# Patient Record
Sex: FEMALE | Race: WHITE | NOT HISPANIC OR LATINO | Employment: FULL TIME | URBAN - METROPOLITAN AREA
[De-identification: names, ages, dates, MRNs, and addresses within clinical notes are randomized per-mention and may not be internally consistent; named-entity substitution may affect disease eponyms.]

---

## 2021-12-08 ENCOUNTER — OFFICE VISIT (OUTPATIENT)
Dept: OBSTETRICS AND GYNECOLOGY | Facility: CLINIC | Age: 36
End: 2021-12-08
Payer: COMMERCIAL

## 2021-12-08 VITALS
DIASTOLIC BLOOD PRESSURE: 64 MMHG | SYSTOLIC BLOOD PRESSURE: 106 MMHG | BODY MASS INDEX: 21.35 KG/M2 | WEIGHT: 140.88 LBS | HEIGHT: 68 IN

## 2021-12-08 DIAGNOSIS — Z86.39 HISTORY OF THYROID DISEASE: ICD-10-CM

## 2021-12-08 DIAGNOSIS — Z23 NEEDS FLU SHOT: ICD-10-CM

## 2021-12-08 DIAGNOSIS — Z30.09 BIRTH CONTROL COUNSELING: ICD-10-CM

## 2021-12-08 DIAGNOSIS — Z01.419 WELL WOMAN EXAM: Primary | ICD-10-CM

## 2021-12-08 LAB
B-HCG UR QL: NEGATIVE
CTP QC/QA: YES

## 2021-12-08 PROCEDURE — 99385 PREV VISIT NEW AGE 18-39: CPT | Mod: S$GLB,,, | Performed by: PHYSICIAN ASSISTANT

## 2021-12-08 PROCEDURE — 99999 PR PBB SHADOW E&M-NEW PATIENT-LVL III: ICD-10-PCS | Mod: PBBFAC,,, | Performed by: PHYSICIAN ASSISTANT

## 2021-12-08 PROCEDURE — 81025 URINE PREGNANCY TEST: CPT | Mod: S$GLB,,, | Performed by: PHYSICIAN ASSISTANT

## 2021-12-08 PROCEDURE — 99999 PR PBB SHADOW E&M-NEW PATIENT-LVL III: CPT | Mod: PBBFAC,,, | Performed by: PHYSICIAN ASSISTANT

## 2021-12-08 PROCEDURE — 81025 POCT URINE PREGNANCY: ICD-10-PCS | Mod: S$GLB,,, | Performed by: PHYSICIAN ASSISTANT

## 2021-12-08 PROCEDURE — 99385 PR PREVENTIVE VISIT,NEW,18-39: ICD-10-PCS | Mod: S$GLB,,, | Performed by: PHYSICIAN ASSISTANT

## 2021-12-09 ENCOUNTER — TELEPHONE (OUTPATIENT)
Dept: ORTHOPEDICS | Facility: CLINIC | Age: 36
End: 2021-12-09
Payer: COMMERCIAL

## 2021-12-09 RX ORDER — ETONOGESTREL AND ETHINYL ESTRADIOL VAGINAL RING .015; .12 MG/D; MG/D
1 RING VAGINAL
Qty: 1 EACH | Refills: 11 | Status: SHIPPED | OUTPATIENT
Start: 2021-12-09 | End: 2023-07-20

## 2021-12-22 ENCOUNTER — IMMUNIZATION (OUTPATIENT)
Dept: PRIMARY CARE CLINIC | Facility: CLINIC | Age: 36
End: 2021-12-22
Payer: COMMERCIAL

## 2021-12-22 DIAGNOSIS — Z23 NEED FOR VACCINATION: Primary | ICD-10-CM

## 2021-12-22 PROCEDURE — 0004A COVID-19, MRNA, LNP-S, PF, 30 MCG/0.3 ML DOSE VACCINE: CPT | Mod: CV19,PBBFAC | Performed by: INTERNAL MEDICINE

## 2023-07-06 ENCOUNTER — PATIENT OUTREACH (OUTPATIENT)
Dept: ADMINISTRATIVE | Facility: HOSPITAL | Age: 38
End: 2023-07-06
Payer: COMMERCIAL

## 2023-07-19 NOTE — PROGRESS NOTES
FAMILY MEDICINE  [facility]    Reason for visit:   Chief Complaint   Patient presents with    Establish Care    Annual Exam    Thyroid Problem         SUBJECTIVE: Ivania Ha is a 38 y.o. female  - hashimoto's hypothyroidism presents as a new patient to establish care and refill thyroid medication. Last PCP none recently    Gynecology Dr. Jelena Burden and pending appt to establish care and PAP 8/15/23    Ivania Ha reports that overall she is doing well but she has been out of her thyroid medication for 1 year. She was following with an Endocrinologist in [place] until she moved back to [facility] 2021. She has 1 year of refill of medications and was unable to be re-evaluated to continue her medication.She feels slightly tired but otherwise no symptoms    1. Hypothyroidism     Age diagnosed: 26 yo    Symptomatic at diagnosis: denies fatigue, weight changes, heat/cold intolerance, bowel/skin changes or CVS symptoms. Asymptomatic at time of diagnosis. Family history with father and grandfather  Prior evaluation by Endocrinologist: Yes since diagnosis   Prior thyroid US: yes - Hashimoto's at 30-33 yo and reports that no nodules    Current medication: none     Prior medication:  She reports most recently she was on 137 mcg daily. She reports that prior to her pregnancy she was stable for several years on 125 mcg daily. When she became pregnant with her second child her Endocrinologist increased her to 150 mcg daily. After delivery she was reduced to 137 mcg daily but was never able to follow-up to check her levels after the reduction    Side effects from medication: NA but no prior issues with generic  Scheduled for medication: NA    Symptoms from thyroid issue: fatigue  Concerns: denies    No results found for: TSH, J8JCILD, D0CMVGG, THYROIDAB, FREET4              Review of Systems   All other systems reviewed and are negative.    HEALTH MAINTENANCE:   Health Maintenance   Topic Date  Due    Hepatitis C Screening  Never done    Lipid Panel  Never done    Mammogram  Never done    TETANUS VACCINE  05/27/2030     Health Maintenance Topics with due status: Not Due       Topic Last Completion Date    TETANUS VACCINE 05/27/2020    Influenza Vaccine Not Due     Health Maintenance Due   Topic Date Due    Hepatitis C Screening  Never done    Cervical Cancer Screening  Never done    Lipid Panel  Never done    Mammogram  Never done    COVID-19 Vaccine (4 - Pfizer series) 02/16/2022       HISTORY:   Past Medical History:   Diagnosis Date    Hypothyroidism, unspecified        Past Surgical History:   Procedure Laterality Date    Left breast lumpectomy Left     at 8 yo       Family History   Problem Relation Age of Onset    Hyperlipidemia Mother     Hypertension Mother     Atrial fibrillation Mother     Parkinsonism Father     Hypothyroidism Father     Hypertension Brother     Hyperlipidemia Brother     Autism Daughter     No Known Problems Daughter     Hypertension Maternal Grandmother     Heart disease Maternal Grandmother     Hyperlipidemia Maternal Grandmother     Hypothyroidism Maternal Grandfather     Hypertension Maternal Grandfather     Heart disease Maternal Grandfather     Hyperlipidemia Maternal Grandfather     Diabetes Paternal Grandmother     Diabetes Paternal Grandfather     Breast cancer Neg Hx     Colon cancer Neg Hx     Ovarian cancer Neg Hx        Social History     Tobacco Use    Smoking status: Never    Smokeless tobacco: Never   Substance Use Topics    Alcohol use: Yes    Drug use: Never       Social History     Social History Narrative    . 2 daughters (2023 8 yo with autism and 1 yo). Grew up on the [facility] and mother still lives there. Father lives [place] with stepmother. 2023 father was diagnosed with parkinson's. She works for the [employer] and  works for [employer].She works from home and travels to [place] 2 times a month.        ALLERGIES:   Review of patient's allergies  "indicates:  No Known Allergies    MEDS:   Outpatient Medications as of 7/20/2023   Medication Sig Dispense Refill    levothyroxine (SYNTHROID) 125 MCG tablet Take 1 tablet (125 mcg total) by mouth before breakfast. 90 tablet 0     No current facility-administered medications on file as of 7/20/2023.       Vital signs:   Vitals:    07/20/23 1402   BP: 124/74   Pulse: 74   SpO2: 100%   Weight: 62.5 kg (137 lb 14.4 oz)   Height: 5' 8" (1.727 m)     Body mass index is 20.97 kg/m².    PHYSICAL EXAM:     Physical Exam  Vitals reviewed.   Constitutional:       General: She is not in acute distress.  HENT:      Head: Normocephalic and atraumatic.      Right Ear: Tympanic membrane and ear canal normal.      Left Ear: Tympanic membrane and ear canal normal.   Eyes:      General: No scleral icterus.     Conjunctiva/sclera: Conjunctivae normal.   Neck:      Thyroid: No thyromegaly.      Vascular: No carotid bruit.   Cardiovascular:      Rate and Rhythm: Normal rate and regular rhythm.      Pulses: Normal pulses.      Heart sounds: Normal heart sounds. No murmur heard.    No friction rub. No gallop.   Pulmonary:      Effort: Pulmonary effort is normal.      Breath sounds: Normal breath sounds. No wheezing or rales.   Abdominal:      General: Bowel sounds are normal. There is no distension.      Palpations: Abdomen is soft.      Tenderness: There is no abdominal tenderness.   Musculoskeletal:      Cervical back: Normal range of motion and neck supple.      Right lower leg: No edema.      Left lower leg: No edema.   Lymphadenopathy:      Cervical: No cervical adenopathy.   Skin:     General: Skin is warm.      Capillary Refill: Capillary refill takes less than 2 seconds.   Neurological:      Mental Status: She is alert.           PERTINENT RESULTS:   No visits with results within 1 Week(s) from this visit.   Latest known visit with results is:   Office Visit on 12/08/2021   Component Date Value Ref Range Status    POC Preg Test, " Ur 12/08/2021 Negative  Negative Final     Acceptable 12/08/2021 Yes   Final     No results found for: WBC, HGB, HCT, MCV, PLT    No results found for: TSH, W0CSNTQ, C9OKOEA, THYROIDAB, FREET4  CMP  No results found for: NA, K, CL, CO2, GLU, BUN, CREATININE, CALCIUM, PROT, ALBUMIN, BILITOT, ALKPHOS, AST, ALT, ANIONGAP, EGFRNORACEVR  No results found for: CHOL  No results found for: HDL  No results found for: LDLCALC  No results found for: DLDL  No results found for: TRIG    f1 No results found for: CHOLHDL      ASSESSMENT/PLAN:    1. Hypothyroidism due to Hashimoto's thyroiditis  Overview:  - recommend restart levothyroxine at 125 mcg daily and repeat TSH with FT4 in 6 weeks  - discussed will adjust based off of 6 week labs  - questions elicited and answered  - encouraged to patient to notify me of any questions or concerns    Orders:  -     TSH; Future; Expected date: 07/20/2023  -     T4, FREE; Future; Expected date: 07/20/2023  -     levothyroxine (SYNTHROID) 125 MCG tablet; Take 1 tablet (125 mcg total) by mouth before breakfast.  Dispense: 90 tablet; Refill: 0    2. Screening for metabolic disorder  -     Comprehensive Metabolic Panel; Future; Expected date: 07/20/2023    3. Screening, iron deficiency anemia  -     CBC Auto Differential; Future; Expected date: 07/20/2023    4. Encounter for lipid screening for cardiovascular disease  -     Lipid Panel; Future; Expected date: 07/20/2023    5. Screening for diabetes mellitus (DM)  -     Hemoglobin A1C; Future; Expected date: 07/20/2023    6. Need for hepatitis C screening test  -     Hepatitis C Antibody; Future; Expected date: 07/20/2023    7. Encounter for screening mammogram for breast cancer  -     Mammo Digital Screening Bilat w/ Dung; Future; Expected date: 07/20/2023          ORDERS:   Orders Placed This Encounter    Mammo Digital Screening Bilat w/ Dung    TSH    Lipid Panel    Hepatitis C Antibody    Hemoglobin A1C    Comprehensive  Metabolic Panel    CBC Auto Differential    T4, FREE    levothyroxine (SYNTHROID) 125 MCG tablet       Vaccines recommended: covid-19 booster    Follow-up pending results or sooner if any concerns.      This note is dictated using the M*Modal Fluency Direct word recognition program. There are word recognition mistakes that are occasionally missed on review.    Dr. aDng Bean D.O.   [facility]

## 2023-07-20 ENCOUNTER — OFFICE VISIT (OUTPATIENT)
Dept: PRIMARY CARE CLINIC | Facility: CLINIC | Age: 38
End: 2023-07-20
Attending: FAMILY MEDICINE
Payer: COMMERCIAL

## 2023-07-20 VITALS
OXYGEN SATURATION: 100 % | SYSTOLIC BLOOD PRESSURE: 124 MMHG | HEIGHT: 68 IN | DIASTOLIC BLOOD PRESSURE: 74 MMHG | BODY MASS INDEX: 20.9 KG/M2 | WEIGHT: 137.88 LBS | HEART RATE: 74 BPM

## 2023-07-20 DIAGNOSIS — Z13.1 SCREENING FOR DIABETES MELLITUS (DM): ICD-10-CM

## 2023-07-20 DIAGNOSIS — Z12.31 ENCOUNTER FOR SCREENING MAMMOGRAM FOR BREAST CANCER: ICD-10-CM

## 2023-07-20 DIAGNOSIS — Z11.59 NEED FOR HEPATITIS C SCREENING TEST: ICD-10-CM

## 2023-07-20 DIAGNOSIS — Z13.0 SCREENING, IRON DEFICIENCY ANEMIA: ICD-10-CM

## 2023-07-20 DIAGNOSIS — Z13.228 SCREENING FOR METABOLIC DISORDER: ICD-10-CM

## 2023-07-20 DIAGNOSIS — Z13.220 ENCOUNTER FOR LIPID SCREENING FOR CARDIOVASCULAR DISEASE: ICD-10-CM

## 2023-07-20 DIAGNOSIS — Z13.6 ENCOUNTER FOR LIPID SCREENING FOR CARDIOVASCULAR DISEASE: ICD-10-CM

## 2023-07-20 DIAGNOSIS — E06.3 HYPOTHYROIDISM DUE TO HASHIMOTO'S THYROIDITIS: Primary | ICD-10-CM

## 2023-07-20 DIAGNOSIS — E03.8 HYPOTHYROIDISM DUE TO HASHIMOTO'S THYROIDITIS: Primary | ICD-10-CM

## 2023-07-20 PROBLEM — Z00.01 ENCOUNTER FOR GENERAL ADULT MEDICAL EXAMINATION WITH ABNORMAL FINDINGS: Status: ACTIVE | Noted: 2023-07-20

## 2023-07-20 PROBLEM — Z00.01 ENCOUNTER FOR GENERAL ADULT MEDICAL EXAMINATION WITH ABNORMAL FINDINGS: Status: RESOLVED | Noted: 2023-07-20 | Resolved: 2023-07-20

## 2023-07-20 PROCEDURE — 3074F PR MOST RECENT SYSTOLIC BLOOD PRESSURE < 130 MM HG: ICD-10-PCS | Mod: CPTII,S$GLB,, | Performed by: FAMILY MEDICINE

## 2023-07-20 PROCEDURE — 3078F PR MOST RECENT DIASTOLIC BLOOD PRESSURE < 80 MM HG: ICD-10-PCS | Mod: CPTII,S$GLB,, | Performed by: FAMILY MEDICINE

## 2023-07-20 PROCEDURE — 99204 PR OFFICE/OUTPT VISIT, NEW, LEVL IV, 45-59 MIN: ICD-10-PCS | Mod: S$GLB,,, | Performed by: FAMILY MEDICINE

## 2023-07-20 PROCEDURE — 3008F PR BODY MASS INDEX (BMI) DOCUMENTED: ICD-10-PCS | Mod: CPTII,S$GLB,, | Performed by: FAMILY MEDICINE

## 2023-07-20 PROCEDURE — 99204 OFFICE O/P NEW MOD 45 MIN: CPT | Mod: S$GLB,,, | Performed by: FAMILY MEDICINE

## 2023-07-20 PROCEDURE — 3008F BODY MASS INDEX DOCD: CPT | Mod: CPTII,S$GLB,, | Performed by: FAMILY MEDICINE

## 2023-07-20 PROCEDURE — 99999 PR PBB SHADOW E&M-EST. PATIENT-LVL III: ICD-10-PCS | Mod: PBBFAC,,, | Performed by: FAMILY MEDICINE

## 2023-07-20 PROCEDURE — 99999 PR PBB SHADOW E&M-EST. PATIENT-LVL III: CPT | Mod: PBBFAC,,, | Performed by: FAMILY MEDICINE

## 2023-07-20 PROCEDURE — 1159F MED LIST DOCD IN RCRD: CPT | Mod: CPTII,S$GLB,, | Performed by: FAMILY MEDICINE

## 2023-07-20 PROCEDURE — 3078F DIAST BP <80 MM HG: CPT | Mod: CPTII,S$GLB,, | Performed by: FAMILY MEDICINE

## 2023-07-20 PROCEDURE — 3074F SYST BP LT 130 MM HG: CPT | Mod: CPTII,S$GLB,, | Performed by: FAMILY MEDICINE

## 2023-07-20 PROCEDURE — 1159F PR MEDICATION LIST DOCUMENTED IN MEDICAL RECORD: ICD-10-PCS | Mod: CPTII,S$GLB,, | Performed by: FAMILY MEDICINE

## 2023-07-20 RX ORDER — LEVOTHYROXINE SODIUM 125 UG/1
125 TABLET ORAL
Qty: 90 TABLET | Refills: 0 | Status: SHIPPED | OUTPATIENT
Start: 2023-07-20 | End: 2023-10-17

## 2023-08-15 ENCOUNTER — OFFICE VISIT (OUTPATIENT)
Dept: OBSTETRICS AND GYNECOLOGY | Facility: CLINIC | Age: 38
End: 2023-08-15
Payer: COMMERCIAL

## 2023-08-15 ENCOUNTER — HOSPITAL ENCOUNTER (OUTPATIENT)
Dept: RADIOLOGY | Facility: OTHER | Age: 38
Discharge: HOME | End: 2023-08-15
Attending: FAMILY MEDICINE
Payer: COMMERCIAL

## 2023-08-15 VITALS
WEIGHT: 139.56 LBS | HEIGHT: 68 IN | SYSTOLIC BLOOD PRESSURE: 122 MMHG | BODY MASS INDEX: 21.15 KG/M2 | DIASTOLIC BLOOD PRESSURE: 74 MMHG

## 2023-08-15 DIAGNOSIS — Z30.015 ENCOUNTER FOR INITIAL PRESCRIPTION OF VAGINAL RING HORMONAL CONTRACEPTIVE: ICD-10-CM

## 2023-08-15 DIAGNOSIS — Z12.4 CERVICAL CANCER SCREENING: ICD-10-CM

## 2023-08-15 DIAGNOSIS — Z11.51 ENCOUNTER FOR SCREENING FOR HUMAN PAPILLOMAVIRUS (HPV): ICD-10-CM

## 2023-08-15 DIAGNOSIS — Z01.419 WELL WOMAN EXAM WITH ROUTINE GYNECOLOGICAL EXAM: Primary | ICD-10-CM

## 2023-08-15 DIAGNOSIS — Z12.31 ENCOUNTER FOR SCREENING MAMMOGRAM FOR BREAST CANCER: ICD-10-CM

## 2023-08-15 DIAGNOSIS — Z30.09 BIRTH CONTROL COUNSELING: ICD-10-CM

## 2023-08-15 PROCEDURE — 3008F BODY MASS INDEX DOCD: CPT | Mod: CPTII,S$GLB,,

## 2023-08-15 PROCEDURE — 99999 PR PBB SHADOW E&M-EST. PATIENT-LVL III: CPT | Mod: PBBFAC,,,

## 2023-08-15 PROCEDURE — 87624 HPV HI-RISK TYP POOLED RSLT: CPT

## 2023-08-15 PROCEDURE — 3008F PR BODY MASS INDEX (BMI) DOCUMENTED: ICD-10-PCS | Mod: CPTII,S$GLB,,

## 2023-08-15 PROCEDURE — 3074F SYST BP LT 130 MM HG: CPT | Mod: CPTII,S$GLB,,

## 2023-08-15 PROCEDURE — 88175 CYTOPATH C/V AUTO FLUID REDO: CPT

## 2023-08-15 PROCEDURE — 99396 PR PREVENTIVE VISIT,EST,40-64: ICD-10-PCS | Mod: S$GLB,,,

## 2023-08-15 PROCEDURE — 3078F DIAST BP <80 MM HG: CPT | Mod: CPTII,S$GLB,,

## 2023-08-15 PROCEDURE — 77063 MAMMO DIGITAL SCREENING BILAT WITH TOMO: ICD-10-PCS | Mod: 26,,, | Performed by: RADIOLOGY

## 2023-08-15 PROCEDURE — 77067 SCR MAMMO BI INCL CAD: CPT | Mod: 26,,, | Performed by: RADIOLOGY

## 2023-08-15 PROCEDURE — 3074F PR MOST RECENT SYSTOLIC BLOOD PRESSURE < 130 MM HG: ICD-10-PCS | Mod: CPTII,S$GLB,,

## 2023-08-15 PROCEDURE — 77067 MAMMO DIGITAL SCREENING BILAT WITH TOMO: ICD-10-PCS | Mod: 26,,, | Performed by: RADIOLOGY

## 2023-08-15 PROCEDURE — 99396 PREV VISIT EST AGE 40-64: CPT | Mod: S$GLB,,,

## 2023-08-15 PROCEDURE — 77067 SCR MAMMO BI INCL CAD: CPT | Mod: TC

## 2023-08-15 PROCEDURE — 77063 BREAST TOMOSYNTHESIS BI: CPT | Mod: 26,,, | Performed by: RADIOLOGY

## 2023-08-15 PROCEDURE — 99999 PR PBB SHADOW E&M-EST. PATIENT-LVL III: ICD-10-PCS | Mod: PBBFAC,,,

## 2023-08-15 PROCEDURE — 1160F RVW MEDS BY RX/DR IN RCRD: CPT | Mod: CPTII,S$GLB,,

## 2023-08-15 PROCEDURE — 3078F PR MOST RECENT DIASTOLIC BLOOD PRESSURE < 80 MM HG: ICD-10-PCS | Mod: CPTII,S$GLB,,

## 2023-08-15 PROCEDURE — 1159F PR MEDICATION LIST DOCUMENTED IN MEDICAL RECORD: ICD-10-PCS | Mod: CPTII,S$GLB,,

## 2023-08-15 PROCEDURE — 1160F PR REVIEW ALL MEDS BY PRESCRIBER/CLIN PHARMACIST DOCUMENTED: ICD-10-PCS | Mod: CPTII,S$GLB,,

## 2023-08-15 PROCEDURE — 1159F MED LIST DOCD IN RCRD: CPT | Mod: CPTII,S$GLB,,

## 2023-08-15 RX ORDER — ETONOGESTREL AND ETHINYL ESTRADIOL VAGINAL RING .015; .12 MG/D; MG/D
1 RING VAGINAL
Qty: 3 EACH | Refills: 3 | Status: SHIPPED | OUTPATIENT
Start: 2023-08-15 | End: 2024-08-14

## 2023-08-15 NOTE — PROGRESS NOTES
CC: Annual    HPI: Pt is a 38 y.o.  female who presents for routine annual exam. She gets periods once monthly lasting about 5-6 days. She was previously on Nuva Ring but stopped due to mood changes. Currently uses condoms for contraception. States she would like to try Nuva Ring again. Denies migraines, smoking, DVTs, strokes. She is sexually active with one partner, her . He works in [place] much of the time, so has not been SA since prior to her last period. She works as a  for [employer]. Has two daughters. She does not want STD screening.     FH:   Breast cancer: none  Colon cancer: none  Ovarian cancer: none  Uterine cancer: none    Last pap smear: 2019- NILM, HPV neg  History of abnormal pap smears: none  Mammogram: getting today  STD history: none  Birth control: condoms  OB history:   Tobacco use: none    ROS:  GENERAL: Feeling well overall. Denies fever or chills.   SKIN: Denies rash or lesions.   HEAD: Denies head injury or headache.   NODES: Denies enlarged lymph nodes.   CHEST: Denies chest pain or shortness of breath.   CARDIOVASCULAR: Denies palpitations or left sided chest pain.   ABDOMEN: No abdominal pain, constipation, diarrhea, nausea, vomiting or rectal bleeding.   URINARY: No dysuria, hematuria, or burning on urination.  REPRODUCTIVE: See HPI.   BREASTS: Denies pain, lumps, or nipple discharge.   HEMATOLOGIC: No easy bruisability or excessive bleeding.   MUSCULOSKELETAL: Denies joint pain or swelling.   NEUROLOGIC: Denies syncope or weakness.   PSYCHIATRIC: Denies depression, anxiety or mood swings.    PE:   APPEARANCE: Well nourished, well developed, White female in no acute distress.  NODES: no cervical, supraclavicular, or inguinal lymphadenopathy  BREASTS: Symmetrical, no skin changes or visible lesions. No palpable masses, nipple discharge or adenopathy bilaterally.  ABDOMEN: Soft. No tenderness or masses. No distention. No hernias palpated.   VULVA: No lesions.  Normal external female genitalia.  URETHRAL MEATUS: Normal size and location, no lesions, no prolapse.  URETHRA: No masses, tenderness, or prolapse.  VAGINA: Moist. No lesions or lacerations noted. No abnormal discharge present. No odor present.   CERVIX: No lesions or discharge. No cervical motion tenderness.   UTERUS: Normal size, regular shape, mobile, non-tender.  ADNEXA: No tenderness. No fullness or masses palpated in the adnexal regions.   ANUS PERINEUM: Normal.    Diagnosis:  1. Well woman exam with routine gynecological exam    2. Cervical cancer screening    3. Encounter for screening for human papillomavirus (HPV)    4. Encounter for initial prescription of vaginal ring hormonal contraceptive    5. Birth control counseling        Plan:     Orders Placed This Encounter    HPV High Risk Genotypes, PCR    Liquid-Based Pap Smear, Screening    etonogestreL-ethinyl estradioL (NUVARING) 0.12-0.015 mg/24 hr vaginal ring     - Pap and HPV updated per pt request  - The use of the nuvaring has been fully discussed with the patient. This includes the proper method to initiate and continue nuvaring, the need for regular compliance to ensure adequate contraceptive effect, the physiology which makes it effective.  Instructions given for what to do in event of nuvaring falling out, and warnings about anticipated minor side effects such as breakthrough spotting, nausea, breast tenderness, weight changes, acne, headaches, etc.  She has been told of the more serious potential side effects such as MI, stroke, and deep vein thrombosis, all of which are very unlikely.  She has been asked to report any signs of such serious problems immediately.  The need for additional protection, such as a condom, to prevent exposure to sexually transmitted infections has also been discussed - the patient has been clearly reminded that Nuvaring cannot protect her against diseases such as HIV and others. She understands and wishes to take the  medication as prescribed.   - Nuva Ring RX sent to pharmacy     Patient was counseled today on the new ACS guidelines for cervical cytology screening as well as the current recommendations for breast cancer screening. She was counseled to follow up with her PCP for other routine health maintenance. Counseling session lasted approximately 10 minutes, and all her questions were answered.  For women over the age of 65, you can stop having cervical cancer screenings if you have never had abnormal cervical cells or cervical cancer, and youve had three negative Pap tests in a row. (You also can stop screening if youve had two negative Pap and HPV tests in a row in the past 10 years, with at least one test in the past 5 years.)    Follow-up with me in 1 year for routine exam; pap in 3-5 years.     Jelena Burden, NP-C  OBGYN

## 2023-08-17 LAB
HPV HR 12 DNA SPEC QL NAA+PROBE: NEGATIVE
HPV16 AG SPEC QL: NEGATIVE
HPV18 DNA SPEC QL NAA+PROBE: NEGATIVE

## 2023-08-21 ENCOUNTER — TELEPHONE (OUTPATIENT)
Dept: PRIMARY CARE CLINIC | Facility: CLINIC | Age: 38
End: 2023-08-21
Payer: COMMERCIAL

## 2023-08-21 NOTE — TELEPHONE ENCOUNTER
----- Message from Dang Bean DO sent at 8/17/2023  3:49 PM CDT -----  Please call Ivania Ha   Hi Ivania Ha    Your mammogram was normal.    But remember, this is only a screening test and does not overrule a palpable lump.  Please let me know if you feel something abnormal.    Repeat mammogram in 1 year.    Please let me know if you have any questions or concerns.    Sincerely,  Dr. Dang Bean D.O.   Family Medicine

## 2023-08-22 ENCOUNTER — TELEPHONE (OUTPATIENT)
Dept: OBSTETRICS AND GYNECOLOGY | Facility: CLINIC | Age: 38
End: 2023-08-22
Payer: COMMERCIAL

## 2023-08-22 LAB
FINAL PATHOLOGIC DIAGNOSIS: NORMAL
Lab: NORMAL

## 2023-08-22 NOTE — TELEPHONE ENCOUNTER
----- Message from Jelena Burden NP sent at 8/22/2023 12:31 PM CDT -----  Please call patient to let her know her pap was normal and her HPV was negative.     Your pap smear and HPV testing are both normal. We use pap smears and HPV testing for early detection of cervical cancer. HPV is the human papillomavirus that is associated with cervical pre-cancer and cancer.  Pap smears are used to identify abnormal or pre-cancerous cervical cells, which were not present on your test. We usually repeat these tests every 3-5 years, however this is individualized based on your risk and history of abnormal pap smears.  I do recommend yearly well woman exams, regardless of whether you are due for a pap smear.     Thanks  Jelena

## 2023-09-14 ENCOUNTER — OFFICE VISIT (OUTPATIENT)
Dept: FAMILY MEDICINE | Facility: CLINIC | Age: 38
End: 2023-09-14
Payer: COMMERCIAL

## 2023-09-14 VITALS
DIASTOLIC BLOOD PRESSURE: 68 MMHG | OXYGEN SATURATION: 99 % | BODY MASS INDEX: 20.95 KG/M2 | HEIGHT: 68 IN | WEIGHT: 138.25 LBS | SYSTOLIC BLOOD PRESSURE: 120 MMHG | HEART RATE: 63 BPM

## 2023-09-14 DIAGNOSIS — Z90.49 S/P LAPAROSCOPIC APPENDECTOMY: ICD-10-CM

## 2023-09-14 DIAGNOSIS — K35.80 ACUTE APPENDICITIS, UNSPECIFIED ACUTE APPENDICITIS TYPE: Primary | ICD-10-CM

## 2023-09-14 PROCEDURE — 3008F BODY MASS INDEX DOCD: CPT | Mod: CPTII,S$GLB,, | Performed by: FAMILY MEDICINE

## 2023-09-14 PROCEDURE — 99999 PR PBB SHADOW E&M-EST. PATIENT-LVL IV: ICD-10-PCS | Mod: PBBFAC,,, | Performed by: FAMILY MEDICINE

## 2023-09-14 PROCEDURE — 1160F PR REVIEW ALL MEDS BY PRESCRIBER/CLIN PHARMACIST DOCUMENTED: ICD-10-PCS | Mod: CPTII,S$GLB,, | Performed by: FAMILY MEDICINE

## 2023-09-14 PROCEDURE — 99999 PR PBB SHADOW E&M-EST. PATIENT-LVL IV: CPT | Mod: PBBFAC,,, | Performed by: FAMILY MEDICINE

## 2023-09-14 PROCEDURE — 1159F MED LIST DOCD IN RCRD: CPT | Mod: CPTII,S$GLB,, | Performed by: FAMILY MEDICINE

## 2023-09-14 PROCEDURE — 99213 PR OFFICE/OUTPT VISIT, EST, LEVL III, 20-29 MIN: ICD-10-PCS | Mod: S$GLB,,, | Performed by: FAMILY MEDICINE

## 2023-09-14 PROCEDURE — 3074F PR MOST RECENT SYSTOLIC BLOOD PRESSURE < 130 MM HG: ICD-10-PCS | Mod: CPTII,S$GLB,, | Performed by: FAMILY MEDICINE

## 2023-09-14 PROCEDURE — 1159F PR MEDICATION LIST DOCUMENTED IN MEDICAL RECORD: ICD-10-PCS | Mod: CPTII,S$GLB,, | Performed by: FAMILY MEDICINE

## 2023-09-14 PROCEDURE — 3074F SYST BP LT 130 MM HG: CPT | Mod: CPTII,S$GLB,, | Performed by: FAMILY MEDICINE

## 2023-09-14 PROCEDURE — 3078F DIAST BP <80 MM HG: CPT | Mod: CPTII,S$GLB,, | Performed by: FAMILY MEDICINE

## 2023-09-14 PROCEDURE — 1160F RVW MEDS BY RX/DR IN RCRD: CPT | Mod: CPTII,S$GLB,, | Performed by: FAMILY MEDICINE

## 2023-09-14 PROCEDURE — 99213 OFFICE O/P EST LOW 20 MIN: CPT | Mod: S$GLB,,, | Performed by: FAMILY MEDICINE

## 2023-09-14 PROCEDURE — 3078F PR MOST RECENT DIASTOLIC BLOOD PRESSURE < 80 MM HG: ICD-10-PCS | Mod: CPTII,S$GLB,, | Performed by: FAMILY MEDICINE

## 2023-09-14 PROCEDURE — 3008F PR BODY MASS INDEX (BMI) DOCUMENTED: ICD-10-PCS | Mod: CPTII,S$GLB,, | Performed by: FAMILY MEDICINE

## 2023-09-14 RX ORDER — OXYCODONE HYDROCHLORIDE 5 MG/1
5 TABLET ORAL EVERY 6 HOURS PRN
COMMUNITY
Start: 2023-08-30

## 2023-09-14 RX ORDER — AMOXICILLIN AND CLAVULANATE POTASSIUM 875; 125 MG/1; MG/1
1 TABLET, FILM COATED ORAL 2 TIMES DAILY
COMMUNITY
Start: 2023-09-01

## 2023-09-14 NOTE — PROGRESS NOTES
Subjective:       Patient ID: Ivania Ha is a 38 y.o. female.    Chief Complaint: APPENDECTOMY FU     Patient Active Problem List   Diagnosis    Hypothyroidism due to Hashimoto's thyroiditis      HPI  37 yo female presents today s/p emergent append[place] appendicitis, appendolith.   Patient was treated at [facility].   Available notes in careeverywhere and on patient's portal.     Lap appendectomy done 08/29/2023. Routine recovery and discharge.   Initial diagnosis CT reviewed.     Most recent labs in portal reviewed, at time of discharge. No anemia, no elevation in LFTs. Overall expected. No additional follow up labs needed at this time.     Patient denies post op complications. No dyspnea. Flew back, no clinical signs of VTE. Urinating, stooling well. Has been walking regularly but not at usual exercise intensity. Has not been picking up 3 yo toddler.     Review of Systems   All other systems reviewed and are negative.         Objective:     Vitals:    09/14/23 1350   BP: 120/68   Pulse: 63     Physical Exam  Vitals and nursing note reviewed.   Constitutional:       General: She is not in acute distress.     Appearance: Normal appearance. She is not ill-appearing, toxic-appearing or diaphoretic.   HENT:      Head: Normocephalic and atraumatic.   Eyes:      General: No scleral icterus.     Conjunctiva/sclera: Conjunctivae normal.   Cardiovascular:      Rate and Rhythm: Normal rate.   Pulmonary:      Effort: Pulmonary effort is normal. No respiratory distress.   Abdominal:      General: Abdomen is flat. Bowel sounds are normal. There is no distension.      Palpations: Abdomen is soft. There is no mass.      Tenderness: There is no abdominal tenderness. There is no guarding or rebound.      Hernia: No hernia is present.      Comments: Incisions c/d/I. No dehiscence. Derma rene dissolved. Healing well.    Skin:     Coloration: Skin is not pale.   Neurological:       Mental Status: She is alert. Mental status is at baseline.   Psychiatric:         Attention and Perception: Attention and perception normal.         Mood and Affect: Mood and affect normal.         Speech: Speech normal.         Behavior: Behavior normal.         Cognition and Memory: Cognition and memory normal.         Judgment: Judgment normal.       Assessment:       1. Acute appendicitis, unspecified acute appendicitis type    2. S/P laparoscopic appendectomy        Plan:   Recent relevant labs results reviewed with patient.       1. Acute appendicitis, unspecified acute appendicitis type  2. S/P laparoscopic appendectomy    Records reviewed. No laboratory follow up required. Post op precautions discussed. No heavy lifting or strenuous exercise for 6 weeks post op. Gradual return based on comfort level. No pain at this time.     Patient's questions answered. Plan reviewed with patient at the end of visit. Relevant precautions to chief complaint and reasons to seek further medical care or to contact the office sooner reviewed with patient.     Follow up if symptoms worsen or fail to improve, for w/ PCP.

## 2023-10-16 DIAGNOSIS — E06.3 HYPOTHYROIDISM DUE TO HASHIMOTO'S THYROIDITIS: ICD-10-CM

## 2023-10-16 DIAGNOSIS — E03.8 HYPOTHYROIDISM DUE TO HASHIMOTO'S THYROIDITIS: ICD-10-CM

## 2023-10-16 NOTE — TELEPHONE ENCOUNTER
Care Due:                  Date            Visit Type   Department     Provider  --------------------------------------------------------------------------------                                NP -                              PRIMARY      [facility]  Last Visit: 07-      CARE ([facility])   CARMEN Bean  Next Visit: None Scheduled  None         None Found                                                            Last  Test          Frequency    Reason                     Performed    Due Date  --------------------------------------------------------------------------------    TSH.........  12 months..  levothyroxine............  Not Found    Overdue    [employer] Embedded Care Due Messages. Reference number: [id number].   10/16/2023 1:20:14 AM CDT

## 2023-10-17 RX ORDER — LEVOTHYROXINE SODIUM 125 UG/1
125 TABLET ORAL
Qty: 30 TABLET | Refills: 0 | Status: SHIPPED | OUTPATIENT
Start: 2023-10-17 | End: 2023-11-20

## 2023-10-17 NOTE — TELEPHONE ENCOUNTER
Refill Routing Note   Medication(s) are not appropriate for processing by [facility] for the following reason(s):      Required labs outdated: TSH, T4    [facility] action(s):  Defer Care Due:  Labs due   Medication Therapy Plan:         Appointments  past 12m or future 3m with PCP    Date Provider   Last Visit   7/20/2023 Dang Bean, DO   Next Visit   Visit date not found Dang Bean, DO   ED visits in past 90 days: 0        Note composed:9:38 PM 10/16/2023

## 2023-11-20 DIAGNOSIS — E03.8 HYPOTHYROIDISM DUE TO HASHIMOTO'S THYROIDITIS: ICD-10-CM

## 2023-11-20 DIAGNOSIS — E06.3 HYPOTHYROIDISM DUE TO HASHIMOTO'S THYROIDITIS: ICD-10-CM

## 2023-11-20 RX ORDER — LEVOTHYROXINE SODIUM 125 UG/1
125 TABLET ORAL
Qty: 90 TABLET | Refills: 0 | Status: SHIPPED | OUTPATIENT
Start: 2023-11-20 | End: 2024-03-11

## 2023-11-20 NOTE — TELEPHONE ENCOUNTER
No care due was identified.  Health [facility] Embedded Care Due Messages. Reference number: [id number].   11/20/2023 12:14:40 AM CST

## 2023-11-20 NOTE — TELEPHONE ENCOUNTER
Refill Routing Note   Medication(s) are not appropriate for processing by [facility] for the following reason(s):        Required labs outdated    [facility] action(s):  Defer               Appointments  past 12m or future 3m with PCP    Date Provider   Last Visit   7/20/2023 Dang Bean, DO   Next Visit   Visit date not found Dang Bean, DO   ED visits in past 90 days: 0        Note composed:3:48 AM 11/20/2023

## 2024-03-10 DIAGNOSIS — E03.8 HYPOTHYROIDISM DUE TO HASHIMOTO'S THYROIDITIS: ICD-10-CM

## 2024-03-10 DIAGNOSIS — E06.3 HYPOTHYROIDISM DUE TO HASHIMOTO'S THYROIDITIS: ICD-10-CM

## 2024-03-10 NOTE — TELEPHONE ENCOUNTER
Care Due:                  Date            Visit Type   Department     Provider  --------------------------------------------------------------------------------                                NP -                              PRIMARY      [facility]  Last Visit: 07-      CARE ([facility])   CARMEN Bean  Next Visit: None Scheduled  None         None Found                                                            Last  Test          Frequency    Reason                     Performed    Due Date  --------------------------------------------------------------------------------    TSH.........  12 months..  levothyroxine............  Not Found    Overdue    [employer] Embedded Care Due Messages. Reference number: [id number].   3/10/2024 7:01:19 AM CDT

## 2024-03-11 RX ORDER — LEVOTHYROXINE SODIUM 125 UG/1
125 TABLET ORAL
Qty: 90 TABLET | Refills: 0 | Status: SHIPPED | OUTPATIENT
Start: 2024-03-11 | End: 2024-06-17

## 2024-03-11 NOTE — TELEPHONE ENCOUNTER
Refill Routing Note   Medication(s) are not appropriate for processing by [facility] for the following reason(s):        Required labs outdated    [facility] action(s):  Defer     Requires labs : Yes             Appointments  past 12m or future 3m with PCP    Date Provider   Last Visit   7/20/2023 Dang Bean, DO   Next Visit   Visit date not found Dang Bean, DO   ED visits in past 90 days: 0        Note composed:10:28 AM 03/11/2024

## 2024-09-10 ENCOUNTER — TELEPHONE (OUTPATIENT)
Dept: PRIMARY CARE CLINIC | Facility: CLINIC | Age: 39
End: 2024-09-10
Payer: COMMERCIAL

## 2024-10-18 ENCOUNTER — TELEPHONE (OUTPATIENT)
Dept: PRIMARY CARE CLINIC | Facility: CLINIC | Age: 39
End: 2024-10-18
Payer: COMMERCIAL

## 2024-10-18 DIAGNOSIS — Z12.31 ENCOUNTER FOR SCREENING MAMMOGRAM FOR BREAST CANCER: Primary | ICD-10-CM

## 2024-10-18 NOTE — TELEPHONE ENCOUNTER
----- Message from Nessa sent at 10/18/2024  2:18 PM CDT -----  Type:  Needs Medical Advice    Who Called:pt  Symptoms (please be specific): pt needs new orders put in for her blood work   Would the patient rather a call back or a response via [facility]? call  Best Call Back Number: [phone]  Additional Information:

## 2024-10-18 NOTE — TELEPHONE ENCOUNTER
----- Message from Nessa sent at 10/18/2024  2:46 PM CDT -----  Type:  Mammogram    Caller is requesting to schedule their annual mammogram appointment.  Order is not listed in EPIC.  Please enter order and contact patient to schedule.  Name of Caller:pt  Where would they like the mammogram performed?[facility]  Would the patient rather a call back or a response via [facility]? call  Best Call Back Number:[phone]  Additional Information:  please call pt when orders are put in so she can schedule pt states she is 2 months past due

## 2024-10-29 ENCOUNTER — HOSPITAL ENCOUNTER (OUTPATIENT)
Dept: RADIOLOGY | Facility: OTHER | Age: 39
Discharge: HOME | End: 2024-10-29
Attending: FAMILY MEDICINE
Payer: COMMERCIAL

## 2024-10-29 DIAGNOSIS — Z12.31 ENCOUNTER FOR SCREENING MAMMOGRAM FOR BREAST CANCER: ICD-10-CM

## 2024-10-29 PROCEDURE — 77067 SCR MAMMO BI INCL CAD: CPT | Mod: TC

## 2024-10-30 ENCOUNTER — LAB VISIT (OUTPATIENT)
Dept: LAB | Facility: OTHER | Age: 39
End: 2024-10-30
Attending: FAMILY MEDICINE
Payer: COMMERCIAL

## 2024-10-30 ENCOUNTER — PATIENT MESSAGE (OUTPATIENT)
Dept: PRIMARY CARE CLINIC | Facility: CLINIC | Age: 39
End: 2024-10-30
Payer: COMMERCIAL

## 2024-10-30 DIAGNOSIS — R79.89 LOW TSH LEVEL: ICD-10-CM

## 2024-10-30 DIAGNOSIS — R76.8 POSITIVE HEPATITIS C ANTIBODY TEST: ICD-10-CM

## 2024-10-30 DIAGNOSIS — R76.8 POSITIVE HEPATITIS C ANTIBODY TEST: Primary | ICD-10-CM

## 2024-10-30 PROCEDURE — 87522 HEPATITIS C REVRS TRNSCRPJ: CPT | Performed by: FAMILY MEDICINE

## 2024-10-30 PROCEDURE — 87902 NFCT AGT GNTYP ALYS HEP C: CPT | Performed by: FAMILY MEDICINE

## 2024-10-31 ENCOUNTER — PATIENT MESSAGE (OUTPATIENT)
Dept: PRIMARY CARE CLINIC | Facility: CLINIC | Age: 39
End: 2024-10-31
Payer: COMMERCIAL

## 2024-10-31 DIAGNOSIS — R76.8 HCV ANTIBODY POSITIVE: Primary | ICD-10-CM

## 2024-10-31 LAB
HCV RNA SERPL QL NAA+PROBE: NOT DETECTED
HCV RNA SPEC NAA+PROBE-ACNC: NOT DETECTED IU/ML

## 2024-11-06 LAB
HCV GENTYP SERPL NAA+PROBE: NORMAL
HCV RNA SERPL NAA+PROBE-ACNC: NORMAL IU/ML
HCV RNA SERPL NAA+PROBE-LOG IU: NORMAL LOGIU/ML

## 2024-11-19 ENCOUNTER — PATIENT MESSAGE (OUTPATIENT)
Dept: PRIMARY CARE CLINIC | Facility: CLINIC | Age: 39
End: 2024-11-19
Payer: COMMERCIAL

## 2024-11-19 DIAGNOSIS — E06.3 HYPOTHYROIDISM DUE TO HASHIMOTO'S THYROIDITIS: ICD-10-CM

## 2024-11-19 NOTE — TELEPHONE ENCOUNTER
No care due was identified.  Health [facility] Embedded Care Due Messages. Reference number: [id number].   11/19/2024 9:01:58 AM CST

## 2024-11-19 NOTE — TELEPHONE ENCOUNTER
Refill Encounter    PCP Visits: Recent Visits  No visits were found meeting these conditions.  Showing recent visits within past 360 days and meeting all other requirements  Future Appointments  Date Type Provider Dept   01/08/25 Appointment Dang Bean [facility]   Showing future appointments within next 720 days and meeting all other requirements     Last 3 Blood Pressure:   BP Readings from Last 3 Encounters:   09/14/23 120/68   08/15/23 122/74   07/20/23 124/74     Preferred Pharmacy:   [id number] - [place], [place] - [address] [id number] [facility] [address]  Phone: [phone] Fax: [phone]    Requested RX:  Requested Prescriptions     Pending Prescriptions Disp Refills    levothyroxine (SYNTHROID) 125 MCG tablet 90 tablet 0     Sig: Take 1 tablet (125 mcg total) by mouth before breakfast.      RX Route: Normal

## 2024-11-20 RX ORDER — LEVOTHYROXINE SODIUM 125 UG/1
125 TABLET ORAL
Qty: 90 TABLET | Refills: 0 | Status: SHIPPED | OUTPATIENT
Start: 2024-11-20

## 2025-01-02 ENCOUNTER — LAB VISIT (OUTPATIENT)
Dept: LAB | Facility: OTHER | Age: 40
End: 2025-01-02
Attending: FAMILY MEDICINE
Payer: COMMERCIAL

## 2025-01-02 DIAGNOSIS — R76.8 HCV ANTIBODY POSITIVE: ICD-10-CM

## 2025-01-02 DIAGNOSIS — R79.89 LOW TSH LEVEL: ICD-10-CM

## 2025-01-02 LAB
HCV AB SERPL QL IA: POSITIVE
T4 FREE SERPL-MCNC: 1.21 NG/DL (ref 0.71–1.51)
TSH SERPL DL<=0.005 MIU/L-ACNC: 0.18 UIU/ML (ref 0.4–4)

## 2025-01-02 PROCEDURE — 84443 ASSAY THYROID STIM HORMONE: CPT | Performed by: FAMILY MEDICINE

## 2025-01-02 PROCEDURE — 84439 ASSAY OF FREE THYROXINE: CPT | Performed by: FAMILY MEDICINE

## 2025-01-02 PROCEDURE — 36415 COLL VENOUS BLD VENIPUNCTURE: CPT | Performed by: FAMILY MEDICINE

## 2025-01-02 PROCEDURE — 86803 HEPATITIS C AB TEST: CPT | Performed by: FAMILY MEDICINE

## 2025-01-07 NOTE — PROGRESS NOTES
FAMILY MEDICINE  [facility]    Reason for visit:   Chief Complaint   Patient presents with    Annual Exam         SUBJECTIVE: Ivania Ha is a 39 y.o. female  - hashimoto's hypothyroidism presents for her routine annual physical    Gynecology Dr. Jelena Burden     The patient's last visit with me was on 7/20/2023.    Ivania Ha reports that she is doing well.  She denies any concerns or complaints.  She has a healthy diet.  She exercises regularly.  Her father was diagnosed with Parkinson's disease last year.    1. Hypothyroidism     Age diagnosed: 28 yo    Symptomatic at diagnosis: denies fatigue, weight changes, heat/cold intolerance, bowel/skin changes or CVS symptoms. Asymptomatic at time of diagnosis. Family history with father and grandfather  Prior evaluation by Endocrinologist: Yes since diagnosis   Prior thyroid US: yes - Hashimoto's at 30-33 yo and reports that no nodules    Current medication:   levothyroxine (SYNTHROID) 125 MCG tablet, Take 1 tablet (125 mcg total) by mouth before breakfast., Disp: 90 tablet, Rfl: 0    Prior medication:  NA    Side effects from medication: NA but no prior issues with generic  Scheduled for medication: NA    Symptoms from thyroid issue: fatigue  Concerns: denies    Lab Results       Component                Value               Date                       TSH                      0.182 (L)           01/02/2025                 FREET4                   1.21                01/02/2025                      Review of Systems   All other systems reviewed and are negative.      HEALTH MAINTENANCE:   Health Maintenance   Topic Date Due    COVID-19 Vaccine (4 - 2024-25 season) 09/01/2024    Mammogram  10/29/2025    Cervical Cancer Screening  08/15/2028    TETANUS VACCINE  05/27/2030    RSV Vaccine (Age 60+ and Pregnant patients) (1 - 1-dose 75+ series) 04/21/2058    Hepatitis C Screening  Completed    Influenza Vaccine  Completed    HIV Screening   Completed    Lipid Panel  Completed    Pneumococcal Vaccines (Age 0-49)  Aged Out     Health Maintenance Topics with due status: Not Due       Topic Last Completion Date    TETANUS VACCINE 05/27/2020    Cervical Cancer Screening 08/15/2023    Mammogram 10/29/2024    RSV Vaccine (Age 60+ and Pregnant patients) Not Due     Health Maintenance Due   Topic Date Due    COVID-19 Vaccine (4 - 2024-25 season) 09/01/2024       HISTORY:   Past Medical History:   Diagnosis Date    Hypothyroidism, unspecified        Past Surgical History:   Procedure Laterality Date    Left breast lumpectomy Left     at 8 yo       Family History   Problem Relation Name Age of Onset    Hyperlipidemia Mother      Hypertension Mother      Atrial fibrillation Mother      Parkinsonism Father      Hypothyroidism Father      Hypertension Brother      Hyperlipidemia Brother      Autism Daughter      No Known Problems Daughter      Hypertension Maternal Grandmother      Heart disease Maternal Grandmother      Hyperlipidemia Maternal Grandmother      Hypothyroidism Maternal Grandfather      Hypertension Maternal Grandfather      Heart disease Maternal Grandfather      Hyperlipidemia Maternal Grandfather      Diabetes Paternal Grandmother      Diabetes Paternal Grandfather      Breast cancer Neg Hx      Colon cancer Neg Hx      Ovarian cancer Neg Hx         Social History     Tobacco Use    Smoking status: Never    Smokeless tobacco: Never   Substance Use Topics    Alcohol use: Yes     Comment: occ    Drug use: Never       Social History     Social History Narrative    . 2 daughters (2024 10 yo with autism and 5 yo). [place] [facility] and mother still lives there. Father lives [place] with stepmother. 2023 father was diagnosed with parkinson's. She works for [employer] and  works for [employer].She works from home and travels to [place] 2 times a month.        ALLERGIES:   Review of patient's allergies indicates:  No Known Allergies    MEDS:  "  Current Outpatient Medications on File Prior to Visit   Medication Sig Dispense Refill Last Dose/Taking    [DISCONTINUED] levothyroxine (SYNTHROID) 125 MCG tablet Take 1 tablet (125 mcg total) by mouth before breakfast. 90 tablet 0 Taking    [DISCONTINUED] etonogestreL-ethinyl estradioL (NUVARING) 0.12-0.015 mg/24 hr vaginal ring Place 1 each vaginally every 28 days. 3 each 3          Vital signs:   Vitals:    01/08/25 1227   BP: 118/75   Patient Position: Sitting   Pulse: 79   SpO2: 100%   Weight: 62.4 kg (137 lb 7.3 oz)   Height: 5' 8" (1.727 m)     Body mass index is 20.9 kg/m².    PHYSICAL EXAM:     Physical Exam  Vitals reviewed.   Constitutional:       General: She is not in acute distress.  HENT:      Head: Normocephalic and atraumatic.      Right Ear: Tympanic membrane and ear canal normal.      Left Ear: Tympanic membrane and ear canal normal.   Eyes:      General: No scleral icterus.     Conjunctiva/sclera: Conjunctivae normal.   Neck:      Thyroid: No thyromegaly.      Vascular: No carotid bruit.   Cardiovascular:      Rate and Rhythm: Normal rate and regular rhythm.      Pulses: Normal pulses.      Heart sounds: Normal heart sounds. No murmur heard.     No friction rub. No gallop.   Pulmonary:      Effort: Pulmonary effort is normal.      Breath sounds: Normal breath sounds. No wheezing, rhonchi or rales.   Abdominal:      General: Bowel sounds are normal. There is no distension.      Palpations: Abdomen is soft.      Tenderness: There is no abdominal tenderness.   Musculoskeletal:      Cervical back: Normal range of motion and neck supple.      Right lower leg: No edema.      Left lower leg: No edema.   Lymphadenopathy:      Cervical: No cervical adenopathy.   Skin:     General: Skin is warm.      Capillary Refill: Capillary refill takes less than 2 seconds.   Neurological:      Mental Status: She is alert.             PERTINENT RESULTS:   Lab Visit on 01/02/2025   Component Date Value Ref Range " Status    Free T4 01/02/2025 1.21  0.71 - 1.51 ng/dL Final    TSH 01/02/2025 0.182 (L)  0.400 - 4.000 uIU/mL Final    Hepatitis C Ab 01/02/2025 Positive (A)  Negative Final   Lab Visit on 10/30/2024   Component Date Value Ref Range Status    HCV Quantitative Result 10/30/2024 Not Detected  <12 IU/mL Final    HCV, Qualitative 10/30/2024 Not Detected  Not Detected Final    Comment: This procedure utilizes a real-time polymerase chain reaction test  from [employer]. The amplification target is a conserved region   of the HCV genome. The lower limit of quantitation is <12 IU/mL   (<1.08 Log IU/mL)and the upper limit of quantitation is 30 million   IU/mL (7.48 Log IU/mL).     Specimens reported as Detected but <12 IU/mL contain detectable  levels of Hepatitis C RNA but the viral load is below the limit of   quantitation. A Not Detected result does not rule out HCV infection,   clinical correlation is recommended.      HCV Quantitative Result 10/30/2024 See Below  IU/mL Final    RESULT: <15 NOT DETECTED    HCV Quantitative Log 10/30/2024 See Below  LogIU/mL Final    Comment: RESULT: <1.18 NOT DETECTED    REFERENCE RANGE:  NOT DETECTED  IU/mL  NOT DETECTED  Log IU/mL    For additional information, please refer to  http://education.[employer]/faq/TSF49f8  (This link is being provided for informational/  educational purposes only.)    Test Performed at:  [facility] [address]  [address]     KIMBERLY Todd MD, PhD      Hepatitis C Virus Genotype 10/30/2024 Test Not Performed   Final   Lab Visit on 10/29/2024   Component Date Value Ref Range Status    Cholesterol 10/29/2024 185  120 - 199 mg/dL Final    Comment: The National Cholesterol Education Program (NCEP) has set the  following guidelines (reference ranges) for Cholesterol:  Optimal.....................<200 mg/dL  Borderline High.............200-239 mg/dL  High........................> or = 240  mg/dL      Triglycerides 10/29/2024 82  30 - 150 mg/dL Final    Comment: The National Cholesterol Education Program (NCEP) has set the  following guidelines (reference values) for triglycerides:  Normal......................<150 mg/dL  Borderline High.............150-199 mg/dL  High........................200-499 mg/dL      HDL 10/29/2024 56  40 - 75 mg/dL Final    Comment: The National Cholesterol Education Program (NCEP) has set the  following guidelines (reference values) for HDL Cholesterol:  Low...............<40 mg/dL  Optimal...........>60 mg/dL      LDL Cholesterol 10/29/2024 112.6  63.0 - 159.0 mg/dL Final    Comment: The National Cholesterol Education Program (NCEP) has set the  following guidelines (reference values) for LDL Cholesterol:  Optimal.......................<130 mg/dL  Borderline High...............130-159 mg/dL  High..........................160-189 mg/dL  Very High.....................>190 mg/dL      HDL/Cholesterol Ratio 10/29/2024 30.3  20.0 - 50.0 % Final    Total Cholesterol/HDL Ratio 10/29/2024 3.3  2.0 - 5.0 Final    Non-HDL Cholesterol 10/29/2024 129  mg/dL Final    Comment: Risk category and Non-HDL cholesterol goals:  Coronary heart disease (CHD)or equivalent (10-year risk of CHD >20%):  Non-HDL cholesterol goal     <130 mg/dL  Two or more CHD risk factors and 10-year risk of CHD <= 20%:  Non-HDL cholesterol goal     <160 mg/dL  0 to 1 CHD risk factor:  Non-HDL cholesterol goal     <190 mg/dL      Hemoglobin A1C 10/29/2024 5.3  4.0 - 5.6 % Final    Comment: ADA Screening Guidelines:  5.7-6.4%  Consistent with prediabetes  >or=6.5%  Consistent with diabetes    High levels of fetal hemoglobin interfere with the HbA1C  assay. Heterozygous hemoglobin variants (HbS, HgC, etc)do  not significantly interfere with this assay.   However, presence of multiple variants may affect accuracy.      Estimated Avg Glucose 10/29/2024 105  68 - 131 mg/dL Final    Sodium 10/29/2024 139  136 - 145 mmol/L  Final    Potassium 10/29/2024 3.8  3.5 - 5.1 mmol/L Final    Chloride 10/29/2024 105  95 - 110 mmol/L Final    CO2 10/29/2024 24  23 - 29 mmol/L Final    Glucose 10/29/2024 89  70 - 110 mg/dL Final    BUN 10/29/2024 13  6 - 20 mg/dL Final    Creatinine 10/29/2024 0.8  0.5 - 1.4 mg/dL Final    Calcium 10/29/2024 9.3  8.7 - 10.5 mg/dL Final    Total Protein 10/29/2024 7.6  6.0 - 8.4 g/dL Final    Albumin 10/29/2024 4.4  3.5 - 5.2 g/dL Final    Total Bilirubin 10/29/2024 0.2  0.1 - 1.0 mg/dL Final    Comment: For infants and newborns, interpretation of results should be based  on gestational age, weight and in agreement with clinical  observations.    Premature Infant recommended reference ranges:  Up to 24 hours.............<8.0 mg/dL  Up to 48 hours............<12.0 mg/dL  3-5 days..................<15.0 mg/dL  6-29 days.................<15.0 mg/dL      Alkaline Phosphatase 10/29/2024 60  40 - 150 U/L Final    AST 10/29/2024 14  10 - 40 U/L Final    ALT 10/29/2024 12  10 - 44 U/L Final    eGFR 10/29/2024 >60  >60 mL/min/1.73 m^2 Final    Anion Gap 10/29/2024 10  8 - 16 mmol/L Final    WBC 10/29/2024 6.91  3.90 - 12.70 K/uL Final    RBC 10/29/2024 4.25  4.00 - 5.40 M/uL Final    Hemoglobin 10/29/2024 12.5  12.0 - 16.0 g/dL Final    Hematocrit 10/29/2024 38.8  37.0 - 48.5 % Final    MCV 10/29/2024 91  82 - 98 fL Final    MCH 10/29/2024 29.4  27.0 - 31.0 pg Final    MCHC 10/29/2024 32.2  32.0 - 36.0 g/dL Final    RDW 10/29/2024 12.1  11.5 - 14.5 % Final    Platelets 10/29/2024 192  150 - 450 K/uL Final    MPV 10/29/2024 10.0  9.2 - 12.9 fL Final    Immature Granulocytes 10/29/2024 0.1  0.0 - 0.5 % Final    Gran # (ANC) 10/29/2024 4.0  1.8 - 7.7 K/uL Final    Immature Grans (Abs) 10/29/2024 0.01  0.00 - 0.04 K/uL Final    Comment: Mild elevation in immature granulocytes is non specific and   can be seen in a variety of conditions including stress response,   acute inflammation, trauma and pregnancy. Correlation with  other   laboratory and clinical findings is essential.      Lymph # 10/29/2024 1.9  1.0 - 4.8 K/uL Final    Mono # 10/29/2024 0.5  0.3 - 1.0 K/uL Final    Eos # 10/29/2024 0.4  0.0 - 0.5 K/uL Final    Baso # 10/29/2024 0.10  0.00 - 0.20 K/uL Final    nRBC 10/29/2024 0  0 /100 WBC Final    Gran % 10/29/2024 58.2  38.0 - 73.0 % Final    Lymph % 10/29/2024 27.6  18.0 - 48.0 % Final    Mono % 10/29/2024 7.5  4.0 - 15.0 % Final    Eosinophil % 10/29/2024 5.2  0.0 - 8.0 % Final    Basophil % 10/29/2024 1.4  0.0 - 1.9 % Final    Differential Method 10/29/2024 Automated   Final    Free T4 10/29/2024 1.33  0.71 - 1.51 ng/dL Final    TSH 10/29/2024 0.090 (L)  0.400 - 4.000 uIU/mL Final    HIV 1/2 Ag/Ab 10/29/2024 Negative  Negative Final    Hepatitis C Ab 10/29/2024 Positive (A)  Negative Final         ASSESSMENT/PLAN:    1. Encounter for general adult medical examination with abnormal findings  Overview:  - preventative health counseling  - counseling on current recommendations for breast cancer screening.  Average risk.  Mammogram up-to-date and next due 10/2025 [place]: 11.66%  - counseling on current recommendations for cervical cancer screening. 8/2023 Pap smear negative with negative HPV.  Gynecology recommend repeat in 5 years (2028)  - counseling on current recommendations for colon cancer screening.  Average risk.  Colon cancer screening to start at 45 years old        2. Hypothyroidism due to Hashimoto's thyroiditis  Overview:  Lab Results   Component Value Date    TSH 0.182 (L) 01/02/2025     - recommend decrease levothyroxine 125 mcg daily to 112 mcg daily   -recommend repeat TSH in 6 weeks and will titrate to goal TSH    Orders:  -     levothyroxine (SYNTHROID) 112 MCG tablet; Take 1 tablet (112 mcg total) by mouth before breakfast.  Dispense: 90 tablet; Refill: 0  -     TSH; Future; Expected date: 02/18/2025    3. HCV antibody positive  Overview:  - 10/2024 viral load negative  - 1/2/25 repeat hepatitis-C  antibody still positive      4. Encounter for screening mammogram for breast cancer  -     Mammo Digital Screening Bilat w/ Dung; Future; Expected date: 10/29/2025    5. Need for vaccination  -     Influenza - Trivalent - PF (ADULT)        ORDERS:   Orders Placed This Encounter    Mammo Digital Screening Bilat w/ Dung    Influenza - Trivalent - PF (ADULT)    TSH    levothyroxine (SYNTHROID) 112 MCG tablet     Vaccines recommended: flua nd covid-19    Follow up in about 1 year (around 1/8/2026) for Annual. or sooner with any concerns        This note is dictated using the M*Modal Fluency Direct word recognition program. There are word recognition mistakes that are occasionally missed on review.    Dr. Dang Bean D.O.   Family Medicine

## 2025-01-08 ENCOUNTER — OFFICE VISIT (OUTPATIENT)
Dept: PRIMARY CARE CLINIC | Facility: CLINIC | Age: 40
End: 2025-01-08
Attending: FAMILY MEDICINE
Payer: COMMERCIAL

## 2025-01-08 VITALS
DIASTOLIC BLOOD PRESSURE: 75 MMHG | OXYGEN SATURATION: 100 % | HEIGHT: 68 IN | SYSTOLIC BLOOD PRESSURE: 118 MMHG | WEIGHT: 137.44 LBS | BODY MASS INDEX: 20.83 KG/M2 | HEART RATE: 79 BPM

## 2025-01-08 DIAGNOSIS — Z00.01 ENCOUNTER FOR GENERAL ADULT MEDICAL EXAMINATION WITH ABNORMAL FINDINGS: Primary | ICD-10-CM

## 2025-01-08 DIAGNOSIS — Z12.31 ENCOUNTER FOR SCREENING MAMMOGRAM FOR BREAST CANCER: ICD-10-CM

## 2025-01-08 DIAGNOSIS — R76.8 HCV ANTIBODY POSITIVE: ICD-10-CM

## 2025-01-08 DIAGNOSIS — E06.3 HYPOTHYROIDISM DUE TO HASHIMOTO'S THYROIDITIS: ICD-10-CM

## 2025-01-08 DIAGNOSIS — Z23 NEED FOR VACCINATION: ICD-10-CM

## 2025-01-08 PROCEDURE — G0008 ADMIN INFLUENZA VIRUS VAC: HCPCS | Mod: S$GLB,,, | Performed by: FAMILY MEDICINE

## 2025-01-08 PROCEDURE — 3074F SYST BP LT 130 MM HG: CPT | Mod: CPTII,S$GLB,, | Performed by: FAMILY MEDICINE

## 2025-01-08 PROCEDURE — 1159F MED LIST DOCD IN RCRD: CPT | Mod: CPTII,S$GLB,, | Performed by: FAMILY MEDICINE

## 2025-01-08 PROCEDURE — 99999 PR PBB SHADOW E&M-EST. PATIENT-LVL III: CPT | Mod: PBBFAC,,, | Performed by: FAMILY MEDICINE

## 2025-01-08 PROCEDURE — 90656 IIV3 VACC NO PRSV 0.5 ML IM: CPT | Mod: S$GLB,,, | Performed by: FAMILY MEDICINE

## 2025-01-08 PROCEDURE — 3078F DIAST BP <80 MM HG: CPT | Mod: CPTII,S$GLB,, | Performed by: FAMILY MEDICINE

## 2025-01-08 PROCEDURE — 3008F BODY MASS INDEX DOCD: CPT | Mod: CPTII,S$GLB,, | Performed by: FAMILY MEDICINE

## 2025-01-08 PROCEDURE — 1160F RVW MEDS BY RX/DR IN RCRD: CPT | Mod: CPTII,S$GLB,, | Performed by: FAMILY MEDICINE

## 2025-01-08 PROCEDURE — 99396 PREV VISIT EST AGE 40-64: CPT | Mod: S$GLB,,, | Performed by: FAMILY MEDICINE

## 2025-01-08 RX ORDER — LEVOTHYROXINE SODIUM 112 UG/1
112 TABLET ORAL
Qty: 90 TABLET | Refills: 0 | Status: SHIPPED | OUTPATIENT
Start: 2025-01-08 | End: 2025-04-08

## 2025-02-14 DIAGNOSIS — E06.3 HYPOTHYROIDISM DUE TO HASHIMOTO'S THYROIDITIS: ICD-10-CM

## 2025-02-14 RX ORDER — LEVOTHYROXINE SODIUM 125 UG/1
125 TABLET ORAL
Qty: 90 TABLET | Refills: 0 | OUTPATIENT
Start: 2025-02-14

## 2025-02-14 NOTE — TELEPHONE ENCOUNTER
Refill Decision Note   Ivania Ha  is requesting a refill authorization.  Brief Assessment and Rationale for Refill:  Quick Discontinue     Medication Therapy Plan:  Dosage decreased to 112 mg; QDC      Comments:     Note composed:2:55 AM 02/14/2025

## 2025-02-14 NOTE — TELEPHONE ENCOUNTER
No care due was identified.  [facility] Embedded Care Due Messages. Reference number: [id number].   2/14/2025 12:41:28 AM CST

## 2025-04-12 DIAGNOSIS — E06.3 HYPOTHYROIDISM DUE TO HASHIMOTO'S THYROIDITIS: ICD-10-CM

## 2025-04-12 NOTE — TELEPHONE ENCOUNTER
No care due was identified.  [facility] Embedded Care Due Messages. Reference number: [id number].   4/12/2025 6:56:57 AM CDT

## 2025-04-13 NOTE — TELEPHONE ENCOUNTER
Refill Routing Note   Medication(s) are not appropriate for processing by [facility] for the following reason(s):        Required labs abnormal    [facility] action(s):  Defer               Appointments  past 12m or future 3m with PCP    Date Provider   Last Visit   1/8/2025 Dang Bean, DO   Next Visit   Visit date not found Dang Bean, DO   ED visits in past 90 days: 0        Note composed:9:00 PM 04/12/2025

## 2025-04-14 RX ORDER — LEVOTHYROXINE SODIUM 112 UG/1
112 TABLET ORAL
Qty: 90 TABLET | Refills: 0 | Status: SHIPPED | OUTPATIENT
Start: 2025-04-14

## 2025-06-04 ENCOUNTER — LAB VISIT (OUTPATIENT)
Dept: LAB | Facility: OTHER | Age: 40
End: 2025-06-04
Attending: FAMILY MEDICINE
Payer: COMMERCIAL

## 2025-06-04 ENCOUNTER — TELEPHONE (OUTPATIENT)
Dept: PRIMARY CARE CLINIC | Facility: CLINIC | Age: 40
End: 2025-06-04
Payer: COMMERCIAL

## 2025-06-04 ENCOUNTER — RESULTS FOLLOW-UP (OUTPATIENT)
Dept: PRIMARY CARE CLINIC | Facility: CLINIC | Age: 40
End: 2025-06-04

## 2025-06-04 DIAGNOSIS — E06.3 HYPOTHYROIDISM DUE TO HASHIMOTO'S THYROIDITIS: Primary | ICD-10-CM

## 2025-06-04 DIAGNOSIS — E06.3 HYPOTHYROIDISM DUE TO HASHIMOTO'S THYROIDITIS: ICD-10-CM

## 2025-06-04 LAB
T4 FREE SERPL-MCNC: 1.4 NG/DL (ref 0.71–1.51)
TSH SERPL-ACNC: 0.16 UIU/ML (ref 0.4–4)

## 2025-06-04 PROCEDURE — 84439 ASSAY OF FREE THYROXINE: CPT

## 2025-06-04 PROCEDURE — 36415 COLL VENOUS BLD VENIPUNCTURE: CPT

## 2025-06-04 RX ORDER — LEVOTHYROXINE SODIUM 100 UG/1
100 TABLET ORAL
Qty: 90 TABLET | Refills: 0 | Status: SHIPPED | OUTPATIENT
Start: 2025-06-04 | End: 2025-09-02

## 2025-07-25 DIAGNOSIS — E06.3 HYPOTHYROIDISM DUE TO HASHIMOTO'S THYROIDITIS: ICD-10-CM

## 2025-07-25 RX ORDER — LEVOTHYROXINE SODIUM 112 UG/1
112 TABLET ORAL
Qty: 90 TABLET | Refills: 0 | OUTPATIENT
Start: 2025-07-25

## 2025-07-25 NOTE — TELEPHONE ENCOUNTER
No care due was identified.  [facility] Embedded Care Due Messages. Reference number: [id number].   7/25/2025 12:06:22 AM CDT

## 2025-07-25 NOTE — TELEPHONE ENCOUNTER
Refill Decision Note   Ivania Ha  is requesting a refill authorization.  Brief Assessment and Rationale for Refill:  Quick Discontinue     Medication Therapy Plan:  Discontinued by: Dang Bean DO on 6/4/2025. Dose chg to 100mcg but not yet filled      Comments:     Note composed:6:09 PM 07/25/2025

## 2025-08-28 ENCOUNTER — TELEPHONE (OUTPATIENT)
Dept: PRIMARY CARE CLINIC | Facility: CLINIC | Age: 40
End: 2025-08-28
Payer: COMMERCIAL